# Patient Record
Sex: MALE | URBAN - METROPOLITAN AREA
[De-identification: names, ages, dates, MRNs, and addresses within clinical notes are randomized per-mention and may not be internally consistent; named-entity substitution may affect disease eponyms.]

---

## 2022-05-18 ENCOUNTER — TELEPHONE (OUTPATIENT)
Dept: GERIATRICS | Facility: CLINIC | Age: 72
End: 2022-05-18

## 2022-05-18 NOTE — TELEPHONE ENCOUNTER
[facility] Geriatrics Triage Nurse Telephone Encounter    Provider: JACKI Waters CNP   Facility: [facility] Facility Type:  TCU    Caller: RN    Allergies:  Not on File     Reason for call: Facility RN called to report that resident had a fall without injury. Resident was attempting to transfer himself and slid to the floor. No head strike reported; no injury reported or observed by nurse. Vitals: 116/76 P92, O2 95% RA, T 98.3.    Verbal Order/Direction given by Provider: Monitor per facility policy.    Provider giving Order:  JACKI Waters CNP     Verbal Order given to: Facility RN    Pam Salcedo RN

## 2022-05-19 ENCOUNTER — TRANSITIONAL CARE UNIT VISIT (OUTPATIENT)
Dept: GERIATRICS | Facility: CLINIC | Age: 72
End: 2022-05-19
Payer: COMMERCIAL

## 2022-05-19 VITALS
OXYGEN SATURATION: 93 % | SYSTOLIC BLOOD PRESSURE: 141 MMHG | HEART RATE: 81 BPM | TEMPERATURE: 98.1 F | WEIGHT: 212 LBS | RESPIRATION RATE: 16 BRPM | HEIGHT: 69 IN | DIASTOLIC BLOOD PRESSURE: 83 MMHG | BODY MASS INDEX: 31.4 KG/M2

## 2022-05-19 DIAGNOSIS — R41.89 COGNITIVE IMPAIRMENT: ICD-10-CM

## 2022-05-19 DIAGNOSIS — G89.29 CHRONIC PAIN OF BOTH KNEES: ICD-10-CM

## 2022-05-19 DIAGNOSIS — R53.81 PHYSICAL DECONDITIONING: ICD-10-CM

## 2022-05-19 DIAGNOSIS — E11.65 TYPE 2 DIABETES MELLITUS WITH HYPERGLYCEMIA, WITHOUT LONG-TERM CURRENT USE OF INSULIN (H): ICD-10-CM

## 2022-05-19 DIAGNOSIS — M25.561 CHRONIC PAIN OF BOTH KNEES: ICD-10-CM

## 2022-05-19 DIAGNOSIS — K22.10 ULCERATIVE ESOPHAGITIS: Primary | ICD-10-CM

## 2022-05-19 DIAGNOSIS — M25.562 CHRONIC PAIN OF BOTH KNEES: ICD-10-CM

## 2022-05-19 DIAGNOSIS — F10.10 ALCOHOL ABUSE: ICD-10-CM

## 2022-05-19 DIAGNOSIS — N18.31 STAGE 3A CHRONIC KIDNEY DISEASE (H): ICD-10-CM

## 2022-05-19 DIAGNOSIS — G40.009 PARTIAL IDIOPATHIC EPILEPSY WITH SEIZURES OF LOCALIZED ONSET, NOT INTRACTABLE, WITHOUT STATUS EPILEPTICUS (H): ICD-10-CM

## 2022-05-19 DIAGNOSIS — I10 PRIMARY HYPERTENSION: ICD-10-CM

## 2022-05-19 PROCEDURE — 99304 1ST NF CARE SF/LOW MDM 25: CPT | Performed by: NURSE PRACTITIONER

## 2022-05-19 RX ORDER — MULTIVITAMIN
1 TABLET ORAL DAILY
COMMUNITY
Start: 2022-05-17

## 2022-05-19 RX ORDER — ASPIRIN 81 MG/1
81 TABLET, CHEWABLE ORAL DAILY
COMMUNITY
Start: 2022-05-17

## 2022-05-19 RX ORDER — ATENOLOL 100 MG/1
100 TABLET ORAL DAILY
COMMUNITY
Start: 2021-09-22

## 2022-05-19 RX ORDER — SENNOSIDES A AND B 8.6 MG/1
8.6 TABLET, FILM COATED ORAL 2 TIMES DAILY PRN
COMMUNITY
Start: 2022-03-28

## 2022-05-19 RX ORDER — AMLODIPINE BESYLATE 10 MG/1
10 TABLET ORAL DAILY
COMMUNITY
Start: 2022-05-17

## 2022-05-19 RX ORDER — LANOLIN ALCOHOL/MO/W.PET/CERES
100 CREAM (GRAM) TOPICAL DAILY
COMMUNITY
Start: 2022-05-16

## 2022-05-19 RX ORDER — DOCUSATE SODIUM 100 MG/1
100 CAPSULE, LIQUID FILLED ORAL 2 TIMES DAILY PRN
COMMUNITY
Start: 2022-03-28

## 2022-05-19 RX ORDER — SERTRALINE HYDROCHLORIDE 100 MG/1
150 TABLET, FILM COATED ORAL DAILY
COMMUNITY
Start: 2022-03-03

## 2022-05-19 RX ORDER — LEVETIRACETAM 1000 MG/1
1000 TABLET ORAL 2 TIMES DAILY
COMMUNITY
Start: 2022-03-19

## 2022-05-19 RX ORDER — LOSARTAN POTASSIUM 100 MG/1
100 TABLET ORAL DAILY
COMMUNITY
Start: 2022-05-16

## 2022-05-19 RX ORDER — NYSTATIN 100000 U/G
CREAM TOPICAL SEE ADMIN INSTRUCTIONS
COMMUNITY
Start: 2022-03-02

## 2022-05-19 RX ORDER — AMMONIUM LACTATE 12 G/100G
1 LOTION TOPICAL DAILY
COMMUNITY
Start: 2022-03-02

## 2022-05-19 RX ORDER — PANTOPRAZOLE SODIUM 40 MG/1
40 TABLET, DELAYED RELEASE ORAL 2 TIMES DAILY
COMMUNITY
Start: 2022-05-16

## 2022-05-19 RX ORDER — ACETAMINOPHEN 325 MG/1
650 TABLET ORAL 4 TIMES DAILY PRN
COMMUNITY
Start: 2022-03-02

## 2022-05-19 RX ORDER — ROSUVASTATIN CALCIUM 10 MG/1
10 TABLET, COATED ORAL DAILY
COMMUNITY
Start: 2021-09-22

## 2022-05-19 RX ORDER — TAMSULOSIN HYDROCHLORIDE 0.4 MG/1
0.4 CAPSULE ORAL DAILY
COMMUNITY
Start: 2021-09-22

## 2022-05-19 NOTE — PROGRESS NOTES
[facility] GERIATRICS    PRIMARY CARE PROVIDER AND CLINIC:  LAZARA STALLWORTH MD, [address] [address] / [facility] [address]  Chief Complaint   Patient presents with     Hospital F/U      [place] Medical Record Number:  [id number]  Place of Service where encounter took place:  [facility] (U) [[id number]]    Kendall Blancas  is a 71 year old  (1950), admitted to the above facility from  [facility] . Hospital stay 5/10/22 through 5/16/22.     Patient with PMH DM2, CKD3, CVA, epilepsy on Keppra, GERD, and chronic pain presented with confusion and falls. Patient had some coffee ground emesis with blood clots. EGD showed ulcerative esophagitis, thought to be caused by severe GERD. Started on BID PPI. Concern for altered mental status with wife reported cognitive decline for the past year and daily alcohol use (400mL of whiskey nightly). Neurology felt that this was dementia, low suspicion for Wernicke's or ischemia. He was significantly hypertensive, started on amlodipine and losartan increased.     HPI:    Patient is sleeping in bed. He denies any concerns with ROS questions, just answers yes/no. When provider asks if he has any other concerns, he says he wants to get rid of this pain. Upon further questioning, he has bilateral knee pain. He initially says that it only hurts when he moves them in certain ways, but then when asked if he is having any pain currently while at rest, he says yes. He does indicate that he has had this pain for years. He isn't sure what helps the pain or what he has used in the past.       CODE STATUS/ADVANCE DIRECTIVES DISCUSSION:  Full Code    ALLERGIES:   Allergies   Allergen Reactions     Atorvastatin Muscle Pain (Myalgia) and Other (See Comments)     Lisinopril Other (See Comments) and Swelling     Perceived, not observed  Perceived, not observed       Penicillins Itching and Rash      PAST MEDICAL HISTORY: No past medical history on file.   PAST  SURGICAL HISTORY:   has no past surgical history on file.  FAMILY HISTORY: family history is not on file.  SOCIAL HISTORY:     Patient's living condition: lives with spouse    Post Discharge Medication Reconciliation Status: discharge medications reconciled, continue medications without change  Current Outpatient Medications   Medication Sig     acetaminophen (TYLENOL) 325 MG tablet Take 650 mg by mouth 4 times daily as needed     amLODIPine (NORVASC) 10 MG tablet Take 10 mg by mouth daily     ammonium lactate (LAC-HYDRIN) 12 % external lotion Apply 1 inch topically daily     aspirin (ASA) 81 MG chewable tablet Take 81 mg by mouth daily     atenolol (TENORMIN) 100 MG tablet Take 100 mg by mouth daily     cholecalciferol 50 MCG (2000 UT) CAPS Take 2,000 Units by mouth daily     docusate sodium (COLACE) 100 MG capsule Take 100 mg by mouth 2 times daily as needed     insulin glargine (LANTUS PEN) 100 UNIT/ML pen Inject 18 Units Subcutaneous At Bedtime     levETIRAcetam (KEPPRA) 1000 MG tablet Take 1,000 mg by mouth 2 times daily     linagliptin (TRADJENTA) 5 MG TABS tablet Take 5 mg by mouth daily     losartan (COZAAR) 100 MG tablet Take 100 mg by mouth daily     METAMUCIL FIBER PO Take 1 teaspoonful by mouth daily as needed     Multiple Vitamin (ONE-A-DAY ESSENTIAL) TABS Take 1 tablet by mouth daily     nystatin (MYCOSTATIN) 706927 UNIT/GM external cream Apply topically See Admin Instructions Special Instructions: Apply to both feet     pantoprazole (PROTONIX) 40 MG EC tablet Take 40 mg by mouth 2 times daily     rosuvastatin (CRESTOR) 10 MG tablet Take 10 mg by mouth daily     senna (SENOKOT) 8.6 MG tablet Take 8.6 mg by mouth 2 times daily as needed     sertraline (ZOLOFT) 100 MG tablet Take 150 mg by mouth daily     tamsulosin (FLOMAX) 0.4 MG capsule Take 0.4 mg by mouth daily     thiamine (B-1) 100 MG tablet Take 100 mg by mouth daily     No current facility-administered medications for this visit.  "      ROS:  Limited secondary to cognitive impairment but today pt reports 10 point ROS of systems including Constitutional, Eyes, Respiratory, Cardiovascular, Gastroenterology, Genitourinary, Integumentary, Musculoskeletal, Psychiatric were all negative except for pertinent positives noted in my HPI.    Vitals:  BP (!) 141/83   Pulse 81   Temp 98.1  F (36.7  C)   Resp 16   Ht 1.753 m (5' 9.02\")   Wt 96.2 kg (212 lb)   SpO2 93%   BMI 31.29 kg/m    Exam:  GENERAL APPEARANCE:  Alert, in no distress  ENT:  Mouth and posterior oropharynx normal, moist mucous membranes, normal hearing acuity  EYES:  EOM normal, conjunctiva and lids normal  RESP:  respiratory effort and palpation of chest normal, lungs clear to auscultation , no respiratory distress  CV:  Palpation and auscultation of heart done , regular rate and rhythm, no murmur, rub, or gallop, no edema  ABDOMEN:  bowel sounds normal, soft, non-tender  M/S:   bilateral knees without swelling or erythema, no open areas. Patient reported pain with minimal movement, did not check full ROM  PSYCH:  insight and judgement impaired, memory impaired , affect abnormal flat    Lab/Diagnostic data:  Recent labs in [facility] reviewed by me today.       ASSESSMENT/PLAN:  (K22.10) Ulcerative esophagitis  (primary encounter diagnosis)  Comment: No symptoms reported by patient. Staff have not noted any N/V  Plan: Continue current POC with no changes at this time and adjustments as needed.    (R53.81) Physical deconditioning  (R41.89) Cognitive impairment  Comment: HPI/ROS were challenging, consistent with dementia. OT will evaluate further. Discharge planning will be dependent on his cognitive and functional status  Plan: PT/OT eval and treat, discharge planning per their recommendations.    (E11.65) Type 2 diabetes mellitus with hyperglycemia, without long-term current use of insulin (H)  Comment: Only a few readings so far, all 200s. Will monitor for now.   Plan: Continue current " POC with no changes at this time and adjustments as needed.    (I10) Primary hypertension  Comment: BP currently borderline control 140-160s/70-90s. To avoid risk of hypotension, falls, dizziness and tissue hypoperfusion, recommend BP goal is < 150/90mmHg. Current medications are at max doses, so would need to add a 4th agent if needed.   Plan: Continue current POC with no changes at this time and adjustments as needed.    (M25.561,  M25.562,  G89.29) Chronic pain of both knees  Comment: Noted in history. No need for extensive evaluation at this time. Will add topical therapy  Plan: Diclofenac gel 2g each knee TID. Monitor pain, mobility    (N18.31) Stage 3a chronic kidney disease (H)  Comment: Chronic Kidney Disease due to: Diabetes  and Hypertension.  Plan: Avoid nephrotoxic medications and adjust medications per renal function. Monitor renal function prn. Refer to plan of care for Diabetes  and Hypertension.    (F10.10) Alcohol abuse  Comment: Patient should be out of the window for withdrawal symptoms, but will monitor. He will not have access to alcohol while at TCU. If the plan is for him to discharge home, it may be challenging to limit his use, but social work can certainly give his wife resources    (G40.009) Partial idiopathic epilepsy with seizures of localized onset, not intractable, without status epilepticus (H)  Comment: Monitor for s/sx seizures      Electronically signed by:  JACKI Guerra [facility]  Phone: [phone]

## 2022-05-19 NOTE — LETTER
5/19/2022        RE: Kendall Blancsa  [address]  [facility] [address]        [facility] GERIATRICS    PRIMARY CARE PROVIDER AND CLINIC:  LAZARA STALLWORTH MD, [address] [facility] [address] / [facility] [address]  Chief Complaint   Patient presents with     Hospital F/U      [place] Medical Record Number:  [id number]  Place of Service where encounter took place:  [facility] (U) [[id number]]    Kendall Blancas  is a 71 year old  (1950), admitted to the above facility from  [facility] . Hospital stay 5/10/22 through 5/16/22.     Patient with PMH DM2, CKD3, CVA, epilepsy on Keppra, GERD, and chronic pain presented with confusion and falls. Patient had some coffee ground emesis with blood clots. EGD showed ulcerative esophagitis, thought to be caused by severe GERD. Started on BID PPI. Concern for altered mental status with wife reported cognitive decline for the past year and daily alcohol use (400mL of whiskey nightly). Neurology felt that this was dementia, low suspicion for Wernicke's or ischemia. He was significantly hypertensive, started on amlodipine and losartan increased.     HPI:    Patient is sleeping in bed. He denies any concerns with ROS questions, just answers yes/no. When provider asks if he has any other concerns, he says he wants to get rid of this pain. Upon further questioning, he has bilateral knee pain. He initially says that it only hurts when he moves them in certain ways, but then when asked if he is having any pain currently while at rest, he says yes. He does indicate that he has had this pain for years. He isn't sure what helps the pain or what he has used in the past.       CODE STATUS/ADVANCE DIRECTIVES DISCUSSION:  Full Code    ALLERGIES:   Allergies   Allergen Reactions     Atorvastatin Muscle Pain (Myalgia) and Other (See Comments)     Lisinopril Other (See Comments) and Swelling     Perceived, not observed  Perceived, not observed        Penicillins Itching and Rash      PAST MEDICAL HISTORY: No past medical history on file.   PAST SURGICAL HISTORY:   has no past surgical history on file.  FAMILY HISTORY: family history is not on file.  SOCIAL HISTORY:     Patient's living condition: lives with spouse    Post Discharge Medication Reconciliation Status: discharge medications reconciled, continue medications without change  Current Outpatient Medications   Medication Sig     acetaminophen (TYLENOL) 325 MG tablet Take 650 mg by mouth 4 times daily as needed     amLODIPine (NORVASC) 10 MG tablet Take 10 mg by mouth daily     ammonium lactate (LAC-HYDRIN) 12 % external lotion Apply 1 inch topically daily     aspirin (ASA) 81 MG chewable tablet Take 81 mg by mouth daily     atenolol (TENORMIN) 100 MG tablet Take 100 mg by mouth daily     cholecalciferol 50 MCG (2000 UT) CAPS Take 2,000 Units by mouth daily     docusate sodium (COLACE) 100 MG capsule Take 100 mg by mouth 2 times daily as needed     insulin glargine (LANTUS PEN) 100 UNIT/ML pen Inject 18 Units Subcutaneous At Bedtime     levETIRAcetam (KEPPRA) 1000 MG tablet Take 1,000 mg by mouth 2 times daily     linagliptin (TRADJENTA) 5 MG TABS tablet Take 5 mg by mouth daily     losartan (COZAAR) 100 MG tablet Take 100 mg by mouth daily     METAMUCIL FIBER PO Take 1 teaspoonful by mouth daily as needed     Multiple Vitamin (ONE-A-DAY ESSENTIAL) TABS Take 1 tablet by mouth daily     nystatin (MYCOSTATIN) 309498 UNIT/GM external cream Apply topically See Admin Instructions Special Instructions: Apply to both feet     pantoprazole (PROTONIX) 40 MG EC tablet Take 40 mg by mouth 2 times daily     rosuvastatin (CRESTOR) 10 MG tablet Take 10 mg by mouth daily     senna (SENOKOT) 8.6 MG tablet Take 8.6 mg by mouth 2 times daily as needed     sertraline (ZOLOFT) 100 MG tablet Take 150 mg by mouth daily     tamsulosin (FLOMAX) 0.4 MG capsule Take 0.4 mg by mouth daily     thiamine (B-1) 100 MG tablet Take 100 mg  "by mouth daily     No current facility-administered medications for this visit.       ROS:  Limited secondary to cognitive impairment but today pt reports 10 point ROS of systems including Constitutional, Eyes, Respiratory, Cardiovascular, Gastroenterology, Genitourinary, Integumentary, Musculoskeletal, Psychiatric were all negative except for pertinent positives noted in my HPI.    Vitals:  BP (!) 141/83   Pulse 81   Temp 98.1  F (36.7  C)   Resp 16   Ht 1.753 m (5' 9.02\")   Wt 96.2 kg (212 lb)   SpO2 93%   BMI 31.29 kg/m    Exam:  GENERAL APPEARANCE:  Alert, in no distress  ENT:  Mouth and posterior oropharynx normal, moist mucous membranes, normal hearing acuity  EYES:  EOM normal, conjunctiva and lids normal  RESP:  respiratory effort and palpation of chest normal, lungs clear to auscultation , no respiratory distress  CV:  Palpation and auscultation of heart done , regular rate and rhythm, no murmur, rub, or gallop, no edema  ABDOMEN:  bowel sounds normal, soft, non-tender  M/S:   bilateral knees without swelling or erythema, no open areas. Patient reported pain with minimal movement, did not check full ROM  PSYCH:  insight and judgement impaired, memory impaired , affect abnormal flat    Lab/Diagnostic data:  Recent labs in [facility] reviewed by me today.       ASSESSMENT/PLAN:  (K22.10) Ulcerative esophagitis  (primary encounter diagnosis)  Comment: No symptoms reported by patient. Staff have not noted any N/V  Plan: Continue current POC with no changes at this time and adjustments as needed.    (R53.81) Physical deconditioning  (R41.89) Cognitive impairment  Comment: HPI/ROS were challenging, consistent with dementia. OT will evaluate further. Discharge planning will be dependent on his cognitive and functional status  Plan: PT/OT eval and treat, discharge planning per their recommendations.    (E11.65) Type 2 diabetes mellitus with hyperglycemia, without long-term current use of insulin (H)  Comment: Only " a few readings so far, all 200s. Will monitor for now.   Plan: Continue current POC with no changes at this time and adjustments as needed.    (I10) Primary hypertension  Comment: BP currently borderline control 140-160s/70-90s. To avoid risk of hypotension, falls, dizziness and tissue hypoperfusion, recommend BP goal is < 150/90mmHg. Current medications are at max doses, so would need to add a 4th agent if needed.   Plan: Continue current POC with no changes at this time and adjustments as needed.    (M25.561,  M25.562,  G89.29) Chronic pain of both knees  Comment: Noted in history. No need for extensive evaluation at this time. Will add topical therapy  Plan: Diclofenac gel 2g each knee TID. Monitor pain, mobility    (N18.31) Stage 3a chronic kidney disease (H)  Comment: Chronic Kidney Disease due to: Diabetes  and Hypertension.  Plan: Avoid nephrotoxic medications and adjust medications per renal function. Monitor renal function prn. Refer to plan of care for Diabetes  and Hypertension.    (F10.10) Alcohol abuse  Comment: Patient should be out of the window for withdrawal symptoms, but will monitor. He will not have access to alcohol while at TCU. If the plan is for him to discharge home, it may be challenging to limit his use, but social work can certainly give his wife resources    (G40.009) Partial idiopathic epilepsy with seizures of localized onset, not intractable, without status epilepticus (H)  Comment: Monitor for s/sx seizures      Electronically signed by:  JACKI Guerra [facility] Geriatric Services  Phone: [phone]

## 2022-05-23 ENCOUNTER — TRANSITIONAL CARE UNIT VISIT (OUTPATIENT)
Dept: GERIATRICS | Facility: CLINIC | Age: 72
End: 2022-05-23
Payer: COMMERCIAL

## 2022-05-23 VITALS
HEART RATE: 84 BPM | SYSTOLIC BLOOD PRESSURE: 142 MMHG | WEIGHT: 211 LBS | HEIGHT: 69 IN | BODY MASS INDEX: 31.25 KG/M2 | RESPIRATION RATE: 16 BRPM | TEMPERATURE: 98.5 F | OXYGEN SATURATION: 95 % | DIASTOLIC BLOOD PRESSURE: 68 MMHG

## 2022-05-23 DIAGNOSIS — R53.81 PHYSICAL DECONDITIONING: ICD-10-CM

## 2022-05-23 DIAGNOSIS — E11.65 TYPE 2 DIABETES MELLITUS WITH HYPERGLYCEMIA, WITH LONG-TERM CURRENT USE OF INSULIN (H): ICD-10-CM

## 2022-05-23 DIAGNOSIS — I10 BENIGN ESSENTIAL HYPERTENSION: ICD-10-CM

## 2022-05-23 DIAGNOSIS — Z79.4 TYPE 2 DIABETES MELLITUS WITH HYPERGLYCEMIA, WITH LONG-TERM CURRENT USE OF INSULIN (H): ICD-10-CM

## 2022-05-23 DIAGNOSIS — K22.10 ULCERATIVE ESOPHAGITIS: Primary | ICD-10-CM

## 2022-05-23 DIAGNOSIS — R41.89 COGNITIVE IMPAIRMENT: ICD-10-CM

## 2022-05-23 PROCEDURE — 99309 SBSQ NF CARE MODERATE MDM 30: CPT | Performed by: NURSE PRACTITIONER

## 2022-05-23 NOTE — PROGRESS NOTES
"[facility] GERIATRICS    Chief Complaint   Patient presents with     RECHECK     HPI:  Kendall Blancas is a 71 year old  (1950), who is being seen today for an episodic care visit at: [facility] ([facility]) [[id number]]. [facility] stay 5/10/22 through 5/16/22. Patient with PMH DM2, CKD3, CVA, epilepsy on Keppra, GERD, and chronic pain presented with confusion and falls. Patient had some coffee ground emesis with blood clots. EGD showed ulcerative esophagitis, thought to be caused by severe GERD. Started on BID PPI. Concern for altered mental status with wife reported cognitive decline for the past year and daily alcohol use (400mL of whiskey nightly). Neurology felt that this was dementia, low suspicion for Wernicke's or ischemia. He was significantly hypertensive, started on amlodipine and losartan increased.     Today's concern is:   Ulcerative esophagitis  No nausea or vomiting, patient denies indigestion    Physical deconditioning  Cognitive impairment  Patient continues to be evaluated by therapy. [facility] 6/15. He lives in an apartment with his wife. He had 8 hours of PCA per week. His has a  that told social work patient had been declining at home and could likely get more PCA hours. He often complains to staff of having pain, but cannot clarify it, will often say \"all over\"    Type 2 diabetes mellitus with hyperglycemia, with long-term current use of insulin (H)  -240s. Last A1C in March was 8.2%    Benign essential hypertension  -140s/70-80s      Allergies, and PMH/PSH reviewed in [facility] today.    REVIEW OF SYSTEMS:  Limited secondary to cognitive impairment but today pt reports 4 point ROS including Respiratory, CV, GI and , other than that noted in the HPI,  is negative    Objective:   BP (!) 142/68   Pulse 84   Temp 98.5  F (36.9  C)   Resp 16   Ht 1.753 m (5' 9.02\")   Wt 95.7 kg (211 lb)   SpO2 95%   BMI 31.14 kg/m    GENERAL APPEARANCE:  Alert, in " no distress, morbidly obese  EYES:  EOM normal, conjunctiva and lids normal  RESP:  no respiratory distress  PSYCH:  insight and judgement impaired, memory impaired , affect abnormal flat    Recent labs in [facility] reviewed by me today.     Assessment/Plan:  (K22.10) Ulcerative esophagitis  (primary encounter diagnosis)  Comment: No symptoms reported by patient. Staff have not noted any N/V  Plan: Continue current POC with no changes at this time and adjustments as needed.    (R53.81) Physical deconditioning  (R41.89) Cognitive impairment  Comment: HPI/ROS were challenging, consistent with dementia. OT will evaluate further. Discharge planning will be dependent on his cognitive and functional status  Plan: PT/OT eval and treat, discharge planning per their recommendations.    (E11.65,  Z79.4) Type 2 diabetes mellitus with hyperglycemia, with long-term current use of insulin (H)  Comment: Patient could likely benefit from an increase in lantus. Will monitor a few more days and reassess 5/26. Due to weakness and fall risk, need to avoid hypoglycemia  Plan: BGM TID. Adjust medication as clinically indicated.    (I10) Benign essential hypertension  Comment: Adequate control  Plan: Continue current POC with no changes at this time and adjustments as needed.      Electronically signed by: JACKI Guerra CNP

## 2022-05-26 ENCOUNTER — TRANSITIONAL CARE UNIT VISIT (OUTPATIENT)
Dept: GERIATRICS | Facility: CLINIC | Age: 72
End: 2022-05-26
Payer: COMMERCIAL

## 2022-05-26 VITALS
OXYGEN SATURATION: 99 % | DIASTOLIC BLOOD PRESSURE: 70 MMHG | TEMPERATURE: 98.3 F | WEIGHT: 211 LBS | RESPIRATION RATE: 16 BRPM | SYSTOLIC BLOOD PRESSURE: 116 MMHG | HEIGHT: 69 IN | HEART RATE: 82 BPM | BODY MASS INDEX: 31.25 KG/M2

## 2022-05-26 DIAGNOSIS — Z79.4 TYPE 2 DIABETES MELLITUS WITH HYPERGLYCEMIA, WITH LONG-TERM CURRENT USE OF INSULIN (H): Primary | ICD-10-CM

## 2022-05-26 DIAGNOSIS — E11.65 TYPE 2 DIABETES MELLITUS WITH HYPERGLYCEMIA, WITH LONG-TERM CURRENT USE OF INSULIN (H): Primary | ICD-10-CM

## 2022-05-26 PROCEDURE — 99308 SBSQ NF CARE LOW MDM 20: CPT | Performed by: NURSE PRACTITIONER

## 2022-05-26 NOTE — LETTER
"    5/26/2022        RE: Kendall Blancas  [address]  [facility] [address]        M [facility] GERIATRICS    Chief Complaint   Patient presents with     RECHECK     Diabetes     HPI:  Kendall Blancas is a 71 year old  (1950), who is being seen today for an episodic care visit at: [facility] ([facility]) [[id number]].     Today's concern is:   Patient is seen today to follow up on diabetes. Blood sugars are all >170, over half >200. He is currently eating breakfast, says his appetite is good. He feels well.     Allergies, and PMH/PSH reviewed in [facility] today.    REVIEW OF SYSTEMS:  Limited secondary to cognitive impairment but today pt reports 4 point ROS including Respiratory, CV, GI and , other than that noted in the HPI,  is negative    Objective:   /70   Pulse 82   Temp 98.3  F (36.8  C)   Resp 16   Ht 1.753 m (5' 9\")   Wt 95.7 kg (211 lb)   SpO2 99%   BMI 31.16 kg/m    GENERAL APPEARANCE:  Alert, in no distress  RESP:  no respiratory distress  PSYCH:  insight and judgement impaired, memory impaired , affect and mood normal    Recent labs in [facility] reviewed by me today.       Assessment/Plan:  (E11.65,  Z79.4) Type 2 diabetes mellitus with hyperglycemia, with long-term current use of insulin (H)  (primary encounter diagnosis)  Comment: Poorly controlled. Will increase lantus  Plan: Increase lantus to 22 units at bedtime. BGM TID. Adjust medication as clinically indicated.      Electronically signed by: JACKI Guerra CNP   [facility] Geriatric Services  Phone: [phone]              "

## 2022-05-26 NOTE — PROGRESS NOTES
"[facility] GERIATRICS    Chief Complaint   Patient presents with     RECHECK     Diabetes     HPI:  Kendall Blancas is a 71 year old  (1950), who is being seen today for an episodic care visit at: [facility] ([facility]) [[id number]].     Today's concern is:   Patient is seen today to follow up on diabetes. Blood sugars are all >170, over half >200. He is currently eating breakfast, says his appetite is good. He feels well.     Allergies, and PMH/PSH reviewed in [facility] today.    REVIEW OF SYSTEMS:  Limited secondary to cognitive impairment but today pt reports 4 point ROS including Respiratory, CV, GI and , other than that noted in the HPI,  is negative    Objective:   /70   Pulse 82   Temp 98.3  F (36.8  C)   Resp 16   Ht 1.753 m (5' 9\")   Wt 95.7 kg (211 lb)   SpO2 99%   BMI 31.16 kg/m    GENERAL APPEARANCE:  Alert, in no distress  RESP:  no respiratory distress  PSYCH:  insight and judgement impaired, memory impaired , affect and mood normal    Recent labs in [facility] reviewed by me today.       Assessment/Plan:  (E11.65,  Z79.4) Type 2 diabetes mellitus with hyperglycemia, with long-term current use of insulin (H)  (primary encounter diagnosis)  Comment: Poorly controlled. Will increase lantus  Plan: Increase lantus to 22 units at bedtime. BGM TID. Adjust medication as clinically indicated.      Electronically signed by: JACKI Guerra CNP   [facility] Services  Phone: [phone]        "

## 2022-06-01 ENCOUNTER — TRANSITIONAL CARE UNIT VISIT (OUTPATIENT)
Dept: GERIATRICS | Facility: CLINIC | Age: 72
End: 2022-06-01
Payer: COMMERCIAL

## 2022-06-01 VITALS
SYSTOLIC BLOOD PRESSURE: 91 MMHG | BODY MASS INDEX: 30.9 KG/M2 | HEART RATE: 78 BPM | RESPIRATION RATE: 18 BRPM | HEIGHT: 69 IN | OXYGEN SATURATION: 93 % | TEMPERATURE: 98.5 F | DIASTOLIC BLOOD PRESSURE: 51 MMHG | WEIGHT: 208.6 LBS

## 2022-06-01 DIAGNOSIS — K22.10 ULCERATIVE ESOPHAGITIS: ICD-10-CM

## 2022-06-01 DIAGNOSIS — R41.89 COGNITIVE IMPAIRMENT: Primary | ICD-10-CM

## 2022-06-01 DIAGNOSIS — G89.29 CHRONIC PAIN OF BOTH KNEES: ICD-10-CM

## 2022-06-01 DIAGNOSIS — I10 BENIGN ESSENTIAL HYPERTENSION: ICD-10-CM

## 2022-06-01 DIAGNOSIS — Z79.4 TYPE 2 DIABETES MELLITUS WITH HYPERGLYCEMIA, WITH LONG-TERM CURRENT USE OF INSULIN (H): ICD-10-CM

## 2022-06-01 DIAGNOSIS — E11.65 TYPE 2 DIABETES MELLITUS WITH HYPERGLYCEMIA, WITH LONG-TERM CURRENT USE OF INSULIN (H): ICD-10-CM

## 2022-06-01 DIAGNOSIS — I10 PRIMARY HYPERTENSION: ICD-10-CM

## 2022-06-01 DIAGNOSIS — M25.562 CHRONIC PAIN OF BOTH KNEES: ICD-10-CM

## 2022-06-01 DIAGNOSIS — M25.561 CHRONIC PAIN OF BOTH KNEES: ICD-10-CM

## 2022-06-01 PROCEDURE — 99305 1ST NF CARE MODERATE MDM 35: CPT | Performed by: INTERNAL MEDICINE

## 2022-06-01 NOTE — LETTER
6/1/2022        RE: Kendall Blancas  [address]  [facility] [address]        M [facility] GERIATRICS    PRIMARY CARE PROVIDER AND CLINIC:  LAZARA STALLWORTH MD, [address] [facility] [address] / [facility] [address]  Chief Complaint   Patient presents with     Hospital F/U      [place] Medical Record Number:  [id number]  Place of Service where encounter took place:  [facility] (U)     Kendall Blancas  is a 71 year old  (1950), admitted to the above facility from  [facility] . Hospital stay 5/10/22 through 5/16/22..     Hospital course was reviewed by me, is as per the hospital discharge summary and NP note.      Patient with PMH DM2, CKD3, CVA, epilepsy on Keppra, GERD, chronic pain, who was hospitalized for the evaluation of confusion and falls. History of significant chronic ETOH use.with progressive cognitive decline over the last year. Pt experienced coffee ground emesis, underwent an EGD which showed ulcerative esophagitis, thought to be caused by severe GERD. Started on BID PPI.   Course complicated by poorly controlled hypertension, for which medications were adjusted.    Pt's progress in TCU has been limited secondary to cognitive deficits and chronic generalized pain  BG have been elevated, necessitating an increase in Lantus  BP stable    Pt states he feels fine, denies specific physical concerns to me  He denies pain  He denies heartburn, chest pain, nausea or vomiting  Appetite has been good  No recent seizures  At baseline, uses a WC to ambulate secondary to chronic LE pain        CODE STATUS/ADVANCE DIRECTIVES DISCUSSION:  Full Code  CPR/Full code   ALLERGIES:   Allergies   Allergen Reactions     Atorvastatin Muscle Pain (Myalgia) and Other (See Comments)     Lisinopril Other (See Comments) and Swelling     Perceived, not observed  Perceived, not observed       Penicillins Itching and Rash      PAST MEDICAL HISTORY:   PAST SURGICAL HISTORY:       1Hypertension   Priority: High     Kappa light chain disease ()     Pain management contract broken 09/23/2019     Hypertension associated with diabetes (HC) 06/19/2018     1Hyperlipidemia associated with type 2 diabetes mellitus (HC) 06/19/2018     1Controlled diabetes mellitus type 2 with complications (HC) 03/13/2018     Uncontrolled type 2 diabetes mellitus with complication, with long-term current use of insulin 11/28/2017     Family history of colon cancer 04/05/2017     Post-traumatic osteoarthritis of both knees 10/08/2016     1Partial idiopathic epilepsy with seizures not intractable, without status epilepticus 04/01/2016     1Acute ischemic stroke (HC) 03/29/2016     Encounter for long-term (current) use of high-risk medication, severe DJD Knees 09/30/2015     DJD (degenerative joint disease) of knee 02/14/2014     1Long term current use of opiate analgesic 09/11/2013     Controlled substance agreement signed 09/11/2013     Gout 07/12/2013     1CKD (chronic kidney disease) stage 3, GFR 30-59 ml/min () 08/31/2011     S/P RCR, SAD, DCE, mini open BT 9/10/10 08/11/2011     Rotator cuff tear 11/04/2010     Impingement syndrome of shoulder 11/04/2010     Fracture, humerus, greater tuberosity 03/09/2010     Vitamin D deficiency 10/22/2008   GERD  Hyperlipidemia      FAMILY HISTORY:   His family history includes Cancer-colon in his father; Diabetes in his sister; Stroke in his paternal grandfather.        SOCIAL HISTORY:     Patient's living condition: lives with spouse   Quit cigarettes 30 years ago        Current Outpatient Medications   Medication Sig     acetaminophen (TYLENOL) 325 MG tablet Take 650 mg by mouth 4 times daily as needed     amLODIPine (NORVASC) 10 MG tablet Take 10 mg by mouth daily     ammonium lactate (LAC-HYDRIN) 12 % external lotion Apply 1 inch topically daily     aspirin (ASA) 81 MG chewable tablet Take 81 mg by mouth daily     atenolol (TENORMIN) 100 MG tablet Take 100 mg by mouth  "daily     cholecalciferol 50 MCG (2000 UT) CAPS Take 2,000 Units by mouth daily     docusate sodium (COLACE) 100 MG capsule Take 100 mg by mouth 2 times daily as needed     insulin glargine (LANTUS PEN) 100 UNIT/ML pen Inject 22 Units Subcutaneous At Bedtime     levETIRAcetam (KEPPRA) 1000 MG tablet Take 1,000 mg by mouth 2 times daily     linagliptin (TRADJENTA) 5 MG TABS tablet Take 5 mg by mouth daily     losartan (COZAAR) 100 MG tablet Take 100 mg by mouth daily     METAMUCIL FIBER PO Take 1 teaspoonful by mouth daily as needed     Multiple Vitamin (ONE-A-DAY ESSENTIAL) TABS Take 1 tablet by mouth daily     nystatin (MYCOSTATIN) 924026 UNIT/GM external cream Apply topically See Admin Instructions Special Instructions: Apply to both feet     pantoprazole (PROTONIX) 40 MG EC tablet Take 40 mg by mouth 2 times daily     rosuvastatin (CRESTOR) 10 MG tablet Take 10 mg by mouth daily     senna (SENOKOT) 8.6 MG tablet Take 8.6 mg by mouth 2 times daily as needed     sertraline (ZOLOFT) 100 MG tablet Take 150 mg by mouth daily     tamsulosin (FLOMAX) 0.4 MG capsule Take 0.4 mg by mouth daily     thiamine (B-1) 100 MG tablet Take 100 mg by mouth daily     No current facility-administered medications for this visit.       ROS:  Limited secondary to cognitive impairment, Pt has no complaints today    Vitals:  BP 91/51   Pulse 78   Temp 98.5  F (36.9  C)   Resp 18   Ht 1.753 m (5' 9\")   Wt 94.6 kg (208 lb 9.6 oz)   SpO2 93%   BMI 30.80 kg/m    Exam:  Sitting in chair in room  Well nourished appearing  HEENT oral mucosa moist  Lungs clear  CV rrr  Abd soft, protuberant, non-tender  No LE edema  No focal LE weakness  Oriented to person, pleasant,      ASSESSMENT/PLAN:    (K22.10) Ulcerative esophagitis  (primary encounter diagnosis)  Comment:clinically stable on PPI  Plan continue PPI indefinitely monitor GI status, no NSAIDs     (R53.81) Physical deconditioning  (R41.89) Cognitive impairment, chronic  Plan therapies.  " Likely discharge back to home with extensive home health assistance       (E11.65) Type 2 diabetes mellitus with hyperglycemia, without long-term current use of insulin (H)  Historically chronically uncontrolled  Comment:elevated BG since admission  Plan adjust insulin, continue linagliptin,  assure safe administration after discharge     (I10)  Hypertension  CKD stage 3a  Comment: fair control  Plan continue current medications, monitor BP, BMP  Nephrology f/u       (M25.561,  M25.562,  G89.29) Chronic pain of both knees  Chronic impairment in mobility  Plan: Diclofenac gel TID. therapies     (F10.10) Alcohol abuse  Comment: likely a significant contributing factor for GERD, GI bleed  Unclear if contributing to cognitive and functional decline  Plan encourage abstinence after discharge    (G40.009) Partial idiopathic epilepsy with seizures of localized onset, not intractable, without status epilepticus (H)  Stable on Keppra  Comment: Monitor for s/sx seizures    History of CVA  Plan continue ASA, statin, BP control      Jason Sampson MD                   Sincerely,        Jason Sampson MD

## 2022-06-01 NOTE — PROGRESS NOTES
[facility] GERIATRICS    PRIMARY CARE PROVIDER AND CLINIC:  LAZARA STALLWORTH MD, [address] [address] / [facility] [address]  Chief Complaint   Patient presents with     Hospital F/U      [place] Medical Record Number:  [id number]  Place of Service where encounter took place:  [facility] (U)     Kendall Blancas  is a 71 year old  (1950), admitted to the above facility from  [facility] . Hospital stay 5/10/22 through 5/16/22..     Hospital course was reviewed by me, is as per the hospital discharge summary and NP note.      Patient with PMH DM2, CKD3, CVA, epilepsy on Keppra, GERD, chronic pain, who was hospitalized for the evaluation of confusion and falls. History of significant chronic ETOH use.with progressive cognitive decline over the last year. Pt experienced coffee ground emesis, underwent an EGD which showed ulcerative esophagitis, thought to be caused by severe GERD. Started on BID PPI.   Course complicated by poorly controlled hypertension, for which medications were adjusted.    Pt's progress in TCU has been limited secondary to cognitive deficits and chronic generalized pain  BG have been elevated, necessitating an increase in Lantus  BP stable    Pt states he feels fine, denies specific physical concerns to me  He denies pain  He denies heartburn, chest pain, nausea or vomiting  Appetite has been good  No recent seizures  At baseline, uses a WC to ambulate secondary to chronic LE pain        CODE STATUS/ADVANCE DIRECTIVES DISCUSSION:  Full Code  CPR/Full code   ALLERGIES:   Allergies   Allergen Reactions     Atorvastatin Muscle Pain (Myalgia) and Other (See Comments)     Lisinopril Other (See Comments) and Swelling     Perceived, not observed  Perceived, not observed       Penicillins Itching and Rash      PAST MEDICAL HISTORY:   PAST SURGICAL HISTORY:      1Hypertension   Priority: High     Kappa light chain disease (HC)     Pain management contract broken  09/23/2019     Hypertension associated with diabetes (HC) 06/19/2018     1Hyperlipidemia associated with type 2 diabetes mellitus (HC) 06/19/2018     1Controlled diabetes mellitus type 2 with complications (HC) 03/13/2018     Uncontrolled type 2 diabetes mellitus with complication, with long-term current use of insulin 11/28/2017     Family history of colon cancer 04/05/2017     Post-traumatic osteoarthritis of both knees 10/08/2016     1Partial idiopathic epilepsy with seizures not intractable, without status epilepticus 04/01/2016     1Acute ischemic stroke (HC) 03/29/2016     Encounter for long-term (current) use of high-risk medication, severe DJD Knees 09/30/2015     DJD (degenerative joint disease) of knee 02/14/2014     1Long term current use of opiate analgesic 09/11/2013     Controlled substance agreement signed 09/11/2013     Gout 07/12/2013     1CKD (chronic kidney disease) stage 3, GFR 30-59 ml/min () 08/31/2011     S/P RCR, SAD, DCE, mini open BT 9/10/10 08/11/2011     Rotator cuff tear 11/04/2010     Impingement syndrome of shoulder 11/04/2010     Fracture, humerus, greater tuberosity 03/09/2010     Vitamin D deficiency 10/22/2008   GERD  Hyperlipidemia      FAMILY HISTORY:   His family history includes Cancer-colon in his father; Diabetes in his sister; Stroke in his paternal grandfather.        SOCIAL HISTORY:     Patient's living condition: lives with spouse   Quit cigarettes 30 years ago        Current Outpatient Medications   Medication Sig     acetaminophen (TYLENOL) 325 MG tablet Take 650 mg by mouth 4 times daily as needed     amLODIPine (NORVASC) 10 MG tablet Take 10 mg by mouth daily     ammonium lactate (LAC-HYDRIN) 12 % external lotion Apply 1 inch topically daily     aspirin (ASA) 81 MG chewable tablet Take 81 mg by mouth daily     atenolol (TENORMIN) 100 MG tablet Take 100 mg by mouth daily     cholecalciferol 50 MCG (2000 UT) CAPS Take 2,000 Units by mouth daily     docusate sodium  "(COLACE) 100 MG capsule Take 100 mg by mouth 2 times daily as needed     insulin glargine (LANTUS PEN) 100 UNIT/ML pen Inject 22 Units Subcutaneous At Bedtime     levETIRAcetam (KEPPRA) 1000 MG tablet Take 1,000 mg by mouth 2 times daily     linagliptin (TRADJENTA) 5 MG TABS tablet Take 5 mg by mouth daily     losartan (COZAAR) 100 MG tablet Take 100 mg by mouth daily     METAMUCIL FIBER PO Take 1 teaspoonful by mouth daily as needed     Multiple Vitamin (ONE-A-DAY ESSENTIAL) TABS Take 1 tablet by mouth daily     nystatin (MYCOSTATIN) 247807 UNIT/GM external cream Apply topically See Admin Instructions Special Instructions: Apply to both feet     pantoprazole (PROTONIX) 40 MG EC tablet Take 40 mg by mouth 2 times daily     rosuvastatin (CRESTOR) 10 MG tablet Take 10 mg by mouth daily     senna (SENOKOT) 8.6 MG tablet Take 8.6 mg by mouth 2 times daily as needed     sertraline (ZOLOFT) 100 MG tablet Take 150 mg by mouth daily     tamsulosin (FLOMAX) 0.4 MG capsule Take 0.4 mg by mouth daily     thiamine (B-1) 100 MG tablet Take 100 mg by mouth daily     No current facility-administered medications for this visit.       ROS:  Limited secondary to cognitive impairment, Pt has no complaints today    Vitals:  BP 91/51   Pulse 78   Temp 98.5  F (36.9  C)   Resp 18   Ht 1.753 m (5' 9\")   Wt 94.6 kg (208 lb 9.6 oz)   SpO2 93%   BMI 30.80 kg/m    Exam:  Sitting in chair in room  Well nourished appearing  HEENT oral mucosa moist  Lungs clear  CV rrr  Abd soft, protuberant, non-tender  No LE edema  No focal LE weakness  Oriented to person, pleasant,      ASSESSMENT/PLAN:    (K22.10) Ulcerative esophagitis  (primary encounter diagnosis)  Comment:clinically stable on PPI  Plan continue PPI indefinitely monitor GI status, no NSAIDs     (R53.81) Physical deconditioning  (R41.89) Cognitive impairment, chronic  Plan therapies.  Likely discharge back to home with extensive home health assistance       (E11.65) Type 2 diabetes " mellitus with hyperglycemia, without long-term current use of insulin (H)  Historically chronically uncontrolled  Comment:elevated BG since admission  Plan adjust insulin, continue linagliptin,  assure safe administration after discharge     (I10)  Hypertension  CKD stage 3a  Comment: fair control  Plan continue current medications, monitor BP, BMP  Nephrology f/u       (M25.561,  M25.562,  G89.29) Chronic pain of both knees  Chronic impairment in mobility  Plan: Diclofenac gel TID. therapies     (F10.10) Alcohol abuse  Comment: likely a significant contributing factor for GERD, GI bleed  Unclear if contributing to cognitive and functional decline  Plan encourage abstinence after discharge    (G40.009) Partial idiopathic epilepsy with seizures of localized onset, not intractable, without status epilepticus (H)  Stable on Keppra  Comment: Monitor for s/sx seizures    History of CVA  Plan continue ASA, statin, BP control      Jason Sampson MD

## 2022-06-02 ENCOUNTER — DISCHARGE SUMMARY NURSING HOME (OUTPATIENT)
Dept: GERIATRICS | Facility: CLINIC | Age: 72
End: 2022-06-02
Payer: COMMERCIAL

## 2022-06-02 VITALS
DIASTOLIC BLOOD PRESSURE: 70 MMHG | WEIGHT: 208 LBS | TEMPERATURE: 98.2 F | HEART RATE: 80 BPM | SYSTOLIC BLOOD PRESSURE: 120 MMHG | HEIGHT: 69 IN | BODY MASS INDEX: 30.81 KG/M2 | RESPIRATION RATE: 18 BRPM | OXYGEN SATURATION: 93 %

## 2022-06-02 DIAGNOSIS — G89.29 CHRONIC PAIN OF BOTH KNEES: ICD-10-CM

## 2022-06-02 DIAGNOSIS — I10 BENIGN ESSENTIAL HYPERTENSION: ICD-10-CM

## 2022-06-02 DIAGNOSIS — M25.561 CHRONIC PAIN OF BOTH KNEES: ICD-10-CM

## 2022-06-02 DIAGNOSIS — E11.65 TYPE 2 DIABETES MELLITUS WITH HYPERGLYCEMIA, WITH LONG-TERM CURRENT USE OF INSULIN (H): ICD-10-CM

## 2022-06-02 DIAGNOSIS — N18.31 STAGE 3A CHRONIC KIDNEY DISEASE (H): ICD-10-CM

## 2022-06-02 DIAGNOSIS — K22.10 ULCERATIVE ESOPHAGITIS: ICD-10-CM

## 2022-06-02 DIAGNOSIS — M25.562 CHRONIC PAIN OF BOTH KNEES: ICD-10-CM

## 2022-06-02 DIAGNOSIS — R53.81 PHYSICAL DECONDITIONING: Primary | ICD-10-CM

## 2022-06-02 DIAGNOSIS — Z79.4 TYPE 2 DIABETES MELLITUS WITH HYPERGLYCEMIA, WITH LONG-TERM CURRENT USE OF INSULIN (H): ICD-10-CM

## 2022-06-02 PROBLEM — E11.22 TYPE 2 DIABETES MELLITUS WITH CHRONIC KIDNEY DISEASE (H): Status: ACTIVE | Noted: 2020-06-03

## 2022-06-02 PROBLEM — K21.9 ESOPHAGEAL REFLUX: Status: ACTIVE | Noted: 2022-06-02

## 2022-06-02 PROBLEM — R41.89 COGNITIVE IMPAIRMENT: Status: ACTIVE | Noted: 2022-05-12

## 2022-06-02 PROBLEM — M10.00 IDIOPATHIC GOUT: Status: ACTIVE | Noted: 2020-06-03

## 2022-06-02 PROCEDURE — 99316 NF DSCHRG MGMT 30 MIN+: CPT | Performed by: NURSE PRACTITIONER

## 2022-06-02 NOTE — PROGRESS NOTES
[facility] GERIATRICS DISCHARGE SUMMARY  PATIENT'S NAME: Kendall Blancas  YOB: 1950  MEDICAL RECORD NUMBER:  [id number]  Place of Service where encounter took place:  [facility] (U) [[id number]]    PRIMARY CARE PROVIDER AND CLINIC RESPONSIBLE AFTER TRANSFER:   LAZARA STALLWORTH MD, [address] [address] / [facility] [address]    Non-FMG Provider     Transferring providers: JACKI Guerra CNP, Jason Sampson MD  Recent Hospitalization/ED:  Hospital  [facility]  stay 5/10/22 to 5/16/22.  Date of SNF Admission: May 16, 2022  Date of SNF (anticipated) Discharge: June 03, 2022  Discharged to: previous independent home  Cognitive Scores: SLUMS: 10/30  Physical Function: Ambulates up to 8 feet, often refuses, wheelchair bound  DME: No new DME needed    CODE STATUS/ADVANCE DIRECTIVES DISCUSSION:  Full Code   ALLERGIES: Atorvastatin, Lisinopril, and Penicillins    NURSING FACILITY COURSE   Medication Changes/Rationale:     Insulin increased    Diclofenac gel ordered for knee pain    Summary of nursing facility stay:   [facility] stay 5/10/22 through 5/16/22. Patient with PMH DM2, CKD3, CVA, epilepsy on Keppra, GERD, and chronic pain presented with confusion and falls. Patient had some coffee ground emesis with blood clots. EGD showed ulcerative esophagitis, thought to be caused by severe GERD. Started on BID PPI. Concern for altered mental status with wife reported cognitive decline for the past year and daily alcohol use (400mL of whiskey nightly). Neurology felt that this was dementia, low suspicion for Wernicke's or ischemia. He was significantly hypertensive, started on amlodipine and losartan increased.     Physical deconditioning  Participation in therapy was limited. He walked 8 feet at the most, typically refused to try. He needs assistance with wheelchair mobility. Assist with transfers. His wife is aware of his functional status and wishes to take  him home with services.    Ulcerative esophagitis  No nausea or vomiting, patient denies indigestion. Appetite has been good. Continue PPI.     Type 2 diabetes mellitus with hyperglycemia, with long-term current use of insulin (H)  Lantus was increased due to no BG < 170 and over half 200s. Since that change, his lowest  and most remain in 200s. Will not increase insulin further today due to his discharge, but recommend follow up with PCP.     Benign essential hypertension  BP ranges from 110-150s/70-80s. To avoid risk of hypotension, falls, dizziness and tissue hypoperfusion, recommend  BP goal is < 150/90mmHg. BP is only rarely above that goal.    Stage 3a chronic kidney disease (H)  Chronic Kidney Disease due to: Diabetes and Hypertension. Avoid nephrotoxic medications and adjust medications per renal function.    Chronic pain of both knees  During first visit, patient was very concerned about his bilateral knee pain. Diclofenac gel was ordered. At subsequent visits he denied pain. It is not clear if the diclofenac is helping or if he was just having a bad day that first visit. Recommend continuing for now      Discharge Medications:    Current Outpatient Medications   Medication Sig Dispense Refill     acetaminophen (TYLENOL) 325 MG tablet Take 650 mg by mouth 4 times daily as needed       amLODIPine (NORVASC) 10 MG tablet Take 10 mg by mouth daily       ammonium lactate (LAC-HYDRIN) 12 % external lotion Apply 1 inch topically daily       aspirin (ASA) 81 MG chewable tablet Take 81 mg by mouth daily       atenolol (TENORMIN) 100 MG tablet Take 100 mg by mouth daily       cholecalciferol 50 MCG (2000 UT) CAPS Take 2,000 Units by mouth daily       docusate sodium (COLACE) 100 MG capsule Take 100 mg by mouth 2 times daily as needed       insulin glargine (LANTUS PEN) 100 UNIT/ML pen Inject 22 Units Subcutaneous At Bedtime 15 mL      levETIRAcetam (KEPPRA) 1000 MG tablet Take 1,000 mg by mouth 2 times daily    "    linagliptin (TRADJENTA) 5 MG TABS tablet Take 5 mg by mouth daily       losartan (COZAAR) 100 MG tablet Take 100 mg by mouth daily       METAMUCIL FIBER PO Take 1 teaspoonful by mouth daily as needed       Multiple Vitamin (ONE-A-DAY ESSENTIAL) TABS Take 1 tablet by mouth daily       nystatin (MYCOSTATIN) 048092 UNIT/GM external cream Apply topically See Admin Instructions Special Instructions: Apply to both feet       pantoprazole (PROTONIX) 40 MG EC tablet Take 40 mg by mouth 2 times daily       rosuvastatin (CRESTOR) 10 MG tablet Take 10 mg by mouth daily       senna (SENOKOT) 8.6 MG tablet Take 8.6 mg by mouth 2 times daily as needed       sertraline (ZOLOFT) 100 MG tablet Take 150 mg by mouth daily       tamsulosin (FLOMAX) 0.4 MG capsule Take 0.4 mg by mouth daily       thiamine (B-1) 100 MG tablet Take 100 mg by mouth daily            Controlled medications:   not applicable/none     Past Medical History: No past medical history on file.  Physical Exam:   Vitals: /70   Pulse 80   Temp 98.2  F (36.8  C)   Resp 18   Ht 1.753 m (5' 9\")   Wt 94.3 kg (208 lb)   SpO2 93%   BMI 30.72 kg/m    BMI: Body mass index is 30.72 kg/m .  GENERAL APPEARANCE:  Alert, in no distress  ENT:  Mouth and posterior oropharynx normal, moist mucous membranes  EYES:  EOM normal, conjunctiva and lids normal  RESP:  respiratory effort and palpation of chest normal, lungs clear to auscultation , no respiratory distress  CV:  Palpation and auscultation of heart done , regular rate and rhythm, no murmur, rub, or gallop, no edema  ABDOMEN:  bowel sounds normal, soft, non-tender  PSYCH:  insight and judgement impaired, memory impaired , affect abnormal flat     SNF labs: Labs done in SNF are in [place] EPIC. Please refer to them using EPIC/Care Everywhere.    DISCHARGE PLAN:    Follow up labs: No labs orders/due    Medical Follow Up:      Follow up with primary care provider in 1-2 weeks    Discharge Services: Home Care:  " Occupational Therapy, Physical Therapy, Home Health Aide, From:  [facility] and resume previous PCA services      TOTAL DISCHARGE TIME:   Greater than 30 minutes  Electronically signed by:  JACKI Guerra CNP [facility]  Phone: [phone]      Documentation of Face to Face and Certification for Home Health Services    I certify that services are/were furnished while this patient was under the care of a physician and that a physician or an allowed non-physician practitioner (NPP), had a face-to-face encounter that meets the physician face-to-face encounter requirements. The encounter was in whole, or in part, related to the primary reason for home health. The patient is confined to his/her home and needs intermittent skilled nursing, physical therapy, speech-language pathology, or the continued need for occupational therapy. A plan of care has been established by a physician and is periodically reviewed by a physician.  Date of Face-to-Face Encounter: 6/2/2022.    I certify that, based on my findings, the following services are medically necessary home health services: Occupational Therapy and Physical Therapy.    My clinical findings support the need for the above skilled services because: Requires assistance of another person or specialized equipment to access medical services because patient: Is prone to wander/get lost without assistance., Is unable to walk greater than 8 feet without rest. and Requires supervision of another for safe transfer..    Patient to re-establish plan of care with their PCP within 7-10 days after leaving the facility to reestablish care.  Medicare certified PEC[facility] provider: JACKI Guerra CNP  Date: June 2, 2022

## 2022-06-02 NOTE — LETTER
6/2/2022        RE: Kendall Blancas  [address]  [facility] [address]        [facility] GERIATRICS DISCHARGE SUMMARY  PATIENT'S NAME: Kendall Blancas  YOB: 1950  MEDICAL RECORD NUMBER:  [id number]  Place of Service where encounter took place:  [facility] (TCU) [[id number]]    PRIMARY CARE PROVIDER AND CLINIC RESPONSIBLE AFTER TRANSFER:   LAZARA STALLWORTH MD, [address] [facility] [address] / [facility] [address]    Non-[facility] Provider     Transferring providers: JACKI Guerra CNP, Jason Sampson MD  Recent Hospitalization/ED:  Hospital  [facility]  stay 5/10/22 to 5/16/22.  Date of SNF Admission: May 16, 2022  Date of SNF (anticipated) Discharge: June 03, 2022  Discharged to: previous independent home  Cognitive Scores: SLUMS: 10/30  Physical Function: Ambulates up to 8 feet, often refuses, wheelchair bound  DME: No new DME needed    CODE STATUS/ADVANCE DIRECTIVES DISCUSSION:  Full Code   ALLERGIES: Atorvastatin, Lisinopril, and Penicillins    NURSING FACILITY COURSE   Medication Changes/Rationale:     Insulin increased    Diclofenac gel ordered for knee pain    Summary of nursing facility stay:   [facility] stay 5/10/22 through 5/16/22. Patient with PMH DM2, CKD3, CVA, epilepsy on Keppra, GERD, and chronic pain presented with confusion and falls. Patient had some coffee ground emesis with blood clots. EGD showed ulcerative esophagitis, thought to be caused by severe GERD. Started on BID PPI. Concern for altered mental status with wife reported cognitive decline for the past year and daily alcohol use (400mL of whiskey nightly). Neurology felt that this was dementia, low suspicion for Wernicke's or ischemia. He was significantly hypertensive, started on amlodipine and losartan increased.     Physical deconditioning  Participation in therapy was limited. He walked 8 feet at the most, typically refused to try. He needs assistance with wheelchair  mobility. Assist with transfers. His wife is aware of his functional status and wishes to take him home with services.    Ulcerative esophagitis  No nausea or vomiting, patient denies indigestion. Appetite has been good. Continue PPI.     Type 2 diabetes mellitus with hyperglycemia, with long-term current use of insulin (H)  Lantus was increased due to no BG < 170 and over half 200s. Since that change, his lowest  and most remain in 200s. Will not increase insulin further today due to his discharge, but recommend follow up with PCP.     Benign essential hypertension  BP ranges from 110-150s/70-80s. To avoid risk of hypotension, falls, dizziness and tissue hypoperfusion, recommend  BP goal is < 150/90mmHg. BP is only rarely above that goal.    Stage 3a chronic kidney disease (H)  Chronic Kidney Disease due to: Diabetes and Hypertension. Avoid nephrotoxic medications and adjust medications per renal function.    Chronic pain of both knees  During first visit, patient was very concerned about his bilateral knee pain. Diclofenac gel was ordered. At subsequent visits he denied pain. It is not clear if the diclofenac is helping or if he was just having a bad day that first visit. Recommend continuing for now      Discharge Medications:    Current Outpatient Medications   Medication Sig Dispense Refill     acetaminophen (TYLENOL) 325 MG tablet Take 650 mg by mouth 4 times daily as needed       amLODIPine (NORVASC) 10 MG tablet Take 10 mg by mouth daily       ammonium lactate (LAC-HYDRIN) 12 % external lotion Apply 1 inch topically daily       aspirin (ASA) 81 MG chewable tablet Take 81 mg by mouth daily       atenolol (TENORMIN) 100 MG tablet Take 100 mg by mouth daily       cholecalciferol 50 MCG (2000 UT) CAPS Take 2,000 Units by mouth daily       docusate sodium (COLACE) 100 MG capsule Take 100 mg by mouth 2 times daily as needed       insulin glargine (LANTUS PEN) 100 UNIT/ML pen Inject 22 Units Subcutaneous At  "Bedtime 15 mL      levETIRAcetam (KEPPRA) 1000 MG tablet Take 1,000 mg by mouth 2 times daily       linagliptin (TRADJENTA) 5 MG TABS tablet Take 5 mg by mouth daily       losartan (COZAAR) 100 MG tablet Take 100 mg by mouth daily       METAMUCIL FIBER PO Take 1 teaspoonful by mouth daily as needed       Multiple Vitamin (ONE-A-DAY ESSENTIAL) TABS Take 1 tablet by mouth daily       nystatin (MYCOSTATIN) 749262 UNIT/GM external cream Apply topically See Admin Instructions Special Instructions: Apply to both feet       pantoprazole (PROTONIX) 40 MG EC tablet Take 40 mg by mouth 2 times daily       rosuvastatin (CRESTOR) 10 MG tablet Take 10 mg by mouth daily       senna (SENOKOT) 8.6 MG tablet Take 8.6 mg by mouth 2 times daily as needed       sertraline (ZOLOFT) 100 MG tablet Take 150 mg by mouth daily       tamsulosin (FLOMAX) 0.4 MG capsule Take 0.4 mg by mouth daily       thiamine (B-1) 100 MG tablet Take 100 mg by mouth daily            Controlled medications:   not applicable/none     Past Medical History: No past medical history on file.  Physical Exam:   Vitals: /70   Pulse 80   Temp 98.2  F (36.8  C)   Resp 18   Ht 1.753 m (5' 9\")   Wt 94.3 kg (208 lb)   SpO2 93%   BMI 30.72 kg/m    BMI: Body mass index is 30.72 kg/m .  GENERAL APPEARANCE:  Alert, in no distress  ENT:  Mouth and posterior oropharynx normal, moist mucous membranes  EYES:  EOM normal, conjunctiva and lids normal  RESP:  respiratory effort and palpation of chest normal, lungs clear to auscultation , no respiratory distress  CV:  Palpation and auscultation of heart done , regular rate and rhythm, no murmur, rub, or gallop, no edema  ABDOMEN:  bowel sounds normal, soft, non-tender  PSYCH:  insight and judgement impaired, memory impaired , affect abnormal flat     SNF labs: Labs done in SNF are in [facility] EPIC. Please refer to them using EPIC/Care Everywhere.    DISCHARGE PLAN:    Follow up labs: No labs orders/due    Medical Follow " Up:      Follow up with primary care provider in 1-2 weeks    Discharge Services: Home Care:  Occupational Therapy, Physical Therapy, Home Health Aide, From:  [facility] and resume previous PCA services      TOTAL DISCHARGE TIME:   Greater than 30 minutes  Electronically signed by:  JACKI Guerra CNP [facility]  Phone: [phone]      Documentation of Face to Face and Certification for Home Health Services    I certify that services are/were furnished while this patient was under the care of a physician and that a physician or an allowed non-physician practitioner (NPP), had a face-to-face encounter that meets the physician face-to-face encounter requirements. The encounter was in whole, or in part, related to the primary reason for home health. The patient is confined to his/her home and needs intermittent skilled nursing, physical therapy, speech-language pathology, or the continued need for occupational therapy. A plan of care has been established by a physician and is periodically reviewed by a physician.  Date of Face-to-Face Encounter: 6/2/2022.    I certify that, based on my findings, the following services are medically necessary home health services: Occupational Therapy and Physical Therapy.    My clinical findings support the need for the above skilled services because: Requires assistance of another person or specialized equipment to access medical services because patient: Is prone to wander/get lost without assistance., Is unable to walk greater than 8 feet without rest. and Requires supervision of another for safe transfer..    Patient to re-establish plan of care with their PCP within 7-10 days after leaving the facility to reestablish care.  Medicare certified [facility] provider: JACKI Guerra CNP  Date: June 2, 2022